# Patient Record
Sex: FEMALE | Race: WHITE
[De-identification: names, ages, dates, MRNs, and addresses within clinical notes are randomized per-mention and may not be internally consistent; named-entity substitution may affect disease eponyms.]

---

## 2019-01-18 ENCOUNTER — HOSPITAL ENCOUNTER (EMERGENCY)
Dept: HOSPITAL 91 - FTE | Age: 34
Discharge: HOME | End: 2019-01-18
Payer: MEDICAID

## 2019-01-18 ENCOUNTER — HOSPITAL ENCOUNTER (EMERGENCY)
Dept: HOSPITAL 10 - FTE | Age: 34
Discharge: HOME | End: 2019-01-18
Payer: MEDICAID

## 2019-01-18 VITALS
HEIGHT: 68 IN | BODY MASS INDEX: 27.83 KG/M2 | WEIGHT: 183.65 LBS | BODY MASS INDEX: 27.83 KG/M2 | HEIGHT: 68 IN | WEIGHT: 183.65 LBS

## 2019-01-18 VITALS — SYSTOLIC BLOOD PRESSURE: 150 MMHG | RESPIRATION RATE: 18 BRPM | DIASTOLIC BLOOD PRESSURE: 86 MMHG | HEART RATE: 63 BPM

## 2019-01-18 DIAGNOSIS — R40.2142: ICD-10-CM

## 2019-01-18 DIAGNOSIS — R40.2362: ICD-10-CM

## 2019-01-18 DIAGNOSIS — R10.9: Primary | ICD-10-CM

## 2019-01-18 DIAGNOSIS — R40.2252: ICD-10-CM

## 2019-01-18 LAB
ADD MAN DIFF?: NO
ADD UMIC: NO
ALANINE AMINOTRANSFERASE: 21 IU/L (ref 13–69)
ALBUMIN/GLOBULIN RATIO: 1.36
ALBUMIN: 4.5 G/DL (ref 3.3–4.9)
ALKALINE PHOSPHATASE: 91 IU/L (ref 42–121)
ANION GAP: 8 (ref 5–13)
ASPARTATE AMINO TRANSFERASE: 25 IU/L (ref 15–46)
BASOPHIL #: 0.1 10^3/UL (ref 0–0.1)
BASOPHILS %: 0.7 % (ref 0–2)
BILIRUBIN,DIRECT: 0 MG/DL (ref 0–0.2)
BILIRUBIN,TOTAL: 0.1 MG/DL (ref 0.2–1.3)
BLOOD UREA NITROGEN: 16 MG/DL (ref 7–20)
CALCIUM: 9.8 MG/DL (ref 8.4–10.2)
CARBON DIOXIDE: 26 MMOL/L (ref 21–31)
CHLORIDE: 104 MMOL/L (ref 97–110)
CREATININE: 0.64 MG/DL (ref 0.44–1)
EOSINOPHILS #: 0.3 10^3/UL (ref 0–0.5)
EOSINOPHILS %: 4 % (ref 0–7)
GLOBULIN: 3.3 G/DL (ref 1.3–3.2)
GLUCOSE: 114 MG/DL (ref 70–220)
HEMATOCRIT: 40.2 % (ref 37–47)
HEMOGLOBIN: 13.6 G/DL (ref 12–16)
IMMATURE GRANS #M: 0.02 10^3/UL (ref 0–0.03)
IMMATURE GRANS % (M): 0.2 % (ref 0–0.43)
LIPASE: 67 U/L (ref 23–300)
LYMPHOCYTES #: 2.2 10^3/UL (ref 0.8–2.9)
LYMPHOCYTES %: 26.9 % (ref 15–51)
MEAN CORPUSCULAR HEMOGLOBIN: 29.3 PG (ref 29–33)
MEAN CORPUSCULAR HGB CONC: 33.8 G/DL (ref 32–37)
MEAN CORPUSCULAR VOLUME: 86.6 FL (ref 82–101)
MEAN PLATELET VOLUME: 12.5 FL (ref 7.4–10.4)
MONOCYTE #: 0.9 10^3/UL (ref 0.3–0.9)
MONOCYTES %: 11 % (ref 0–11)
NEUTROPHIL #: 4.7 10^3/UL (ref 1.6–7.5)
NEUTROPHILS %: 57.2 % (ref 39–77)
NUCLEATED RED BLOOD CELLS #: 0 10^3/UL (ref 0–0)
NUCLEATED RED BLOOD CELLS%: 0 /100WBC (ref 0–0)
PLATELET COUNT: 212 10^3/UL (ref 140–415)
POTASSIUM: 4 MMOL/L (ref 3.5–5.1)
RED BLOOD COUNT: 4.64 10^6/UL (ref 4.2–5.4)
RED CELL DISTRIBUTION WIDTH: 12.6 % (ref 11.5–14.5)
SODIUM: 138 MMOL/L (ref 135–144)
TOTAL PROTEIN: 7.8 G/DL (ref 6.1–8.1)
UR ASCORBIC ACID: NEGATIVE MG/DL
UR BILIRUBIN (DIP): NEGATIVE MG/DL
UR BLOOD (DIP): NEGATIVE MG/DL
UR CLARITY: CLEAR
UR COLOR: YELLOW
UR GLUCOSE (DIP): NEGATIVE MG/DL
UR KETONES (DIP): NEGATIVE MG/DL
UR LEUKOCYTE ESTERASE (DIP): NEGATIVE LEU/UL
UR NITRITE (DIP): NEGATIVE MG/DL
UR PH (DIP): 5 (ref 5–9)
UR SPECIFIC GRAVITY (DIP): 1.01 (ref 1–1.03)
UR TOTAL PROTEIN (DIP): NEGATIVE MG/DL
UR UROBILINOGEN (DIP): NEGATIVE MG/DL
WHITE BLOOD COUNT: 8.3 10^3/UL (ref 4.8–10.8)

## 2019-01-18 PROCEDURE — 83690 ASSAY OF LIPASE: CPT

## 2019-01-18 PROCEDURE — 80053 COMPREHEN METABOLIC PANEL: CPT

## 2019-01-18 PROCEDURE — 76775 US EXAM ABDO BACK WALL LIM: CPT

## 2019-01-18 PROCEDURE — 81025 URINE PREGNANCY TEST: CPT

## 2019-01-18 PROCEDURE — 81003 URINALYSIS AUTO W/O SCOPE: CPT

## 2019-01-18 PROCEDURE — 96374 THER/PROPH/DIAG INJ IV PUSH: CPT

## 2019-01-18 PROCEDURE — 36415 COLL VENOUS BLD VENIPUNCTURE: CPT

## 2019-01-18 PROCEDURE — 85025 COMPLETE CBC W/AUTO DIFF WBC: CPT

## 2019-01-18 PROCEDURE — 96375 TX/PRO/DX INJ NEW DRUG ADDON: CPT

## 2019-01-18 PROCEDURE — 99285 EMERGENCY DEPT VISIT HI MDM: CPT

## 2019-01-18 RX ADMIN — THIAMINE HYDROCHLORIDE 1 MLS/HR: 100 INJECTION, SOLUTION INTRAMUSCULAR; INTRAVENOUS at 19:08

## 2019-01-18 RX ADMIN — KETOROLAC TROMETHAMINE 1 MG: 30 INJECTION, SOLUTION INTRAMUSCULAR at 19:28

## 2019-01-18 RX ADMIN — ONDANSETRON HYDROCHLORIDE 1 MG: 2 INJECTION, SOLUTION INTRAMUSCULAR; INTRAVENOUS at 19:28

## 2019-01-18 NOTE — ERD
ER Documentation


Chief Complaint


Chief Complaint


Flank pain.





HPI


33-year-old female patient with no significant past medical history presents to 


the ED planing of left flank pain that started earlier today.  Patient reports 


that because of her history of kidney stones, she is unsure whether this is 


muscle pain or kidney stone pain because she also twisted the left side of her 


back.  Patient reports that she is taking Macrobid for possible UTI - states 


that she has some suprapubic pain. Denies any fever, chills, nausea, vomiting, 


diarrhea, neck stiffness, abdominal pain.





ROS


All systems reviewed and are negative except as per history of present illness.





Medications


Home Meds


Active Scripts


Ibuprofen* (Motrin*) 600 Mg Tab, 600 MG PO Q6, #30 TAB


   Prov:HIPOLITO RUSSELL PA-C         19


Acetaminophen* (Tylophen*) 500 Mg Capsule, 1 CAP PO Q6H PRN for PAIN AND OR 


ELEVATED TEMP, #20 CAP 0 Refills


   Prov:ANALILIA DUDLEY PA-C         3/15/16





Allergies


Allergies:  


Coded Allergies:  


     No Known Allergy (Unverified , 10/28/16)





PMhx/Soc


History of Surgery:  Yes ()


Anesthesia Reaction:  No


Hx Neurological Disorder:  No


Hx Respiratory Disorders:  No


Hx Cardiac Disorders:  No


Hx Psychiatric Problems:  No


Hx Miscellaneous Medical Probl:  No


Hx Alcohol Use:  No


Hx Substance Use:  No


Hx Tobacco Use:  No


Smoking Status:  Never smoker





FmHx


Family History:  No diabetes, No coronary disease





Physical Exam


Vitals


Vital Signs


  Date      Temp  Pulse  Resp  B/P (MAP)   Pulse Ox  O2          O2 Flow    FiO2


Time                                                 Delivery    Rate


   19  98.1     67    16      167/92        98


     18:36                          (117)





Physical Exam


Const: Non-ill-appearing, well-nourished. In no acute distress.


Head: Atraumatic, normocephalic 


Eyes: Normal Conjunctiva without injection. No purulent discharge. PERRLA. EOMI 


ENT: Normal external ear. Ear canal without erythema. Tympanic membrane pearly 


gray without effusion or bulging. Nasal canal clear with normal turbinates. 


Moist oropharynx without tonsillar exudates. Non-erythematous pharynx. Uvula 


midline. No drooling.  No trismus. 


Neck: No cervical midline tenderness.  Full range of motion. No meningismus. No 


cervical lymphadenopathy. No JVD.


Resp: Clear to auscultation bilaterally. No wheezing, rhonchi, rales, or 


crackles. No accessory muscle use. No retractions.


Cardio: Regular rate and rhythm.  No murmurs, rubs or gallops.


Abd: Soft, non tender, non distended. Normal bowel sounds.  No palpable masses. 


No rebound tenderness.  No guarding.  Negative McBurney's Point. Negative 


Abdi's Sign.


Skin: Normal skin turgor. No petechiae or rashes


Back: No midline tenderness. No CVA tenderness.


Ext: No cyanosis, or edema. Distal pulses intact bilaterally.


Neur: Awake and alert. Normal gait. Normal coordination. Cranial Nerves II- VII 


intact. Normal finger to nose. Muscle strength 5/5. Sensation intact.


Psych: Normal Mood and Affect


Results 24 hrs





Laboratory Tests


Test
                               19
13:00   19
19:10  19
19:21


Urine Color                       YELLOW


Urine Clarity                     CLEAR


Urine pH                                     5.0


Urine Specific Gravity                     1.014


Urine Ketones                     NEGATIVE mg/dL


Urine Nitrite                     NEGATIVE mg/dL


Urine Bilirubin                   NEGATIVE mg/dL


Urine Urobilinogen                NEGATIVE mg/dL


Urine Leukocyte Esterase
         NEGATIVE
Divina/ul  
               



Urine Hemoglobin                  NEGATIVE mg/dL


Urine Glucose                     NEGATIVE mg/dL


Urine Total Protein               NEGATIVE mg/dl


White Blood Count                                    8.3 10^3/ul


Red Blood Count                                     4.64 10^6/ul


Hemoglobin                                             13.6 g/dl


Hematocrit                                                40.2 %


Mean Corpuscular Volume                                  86.6 fl


Mean Corpuscular Hemoglobin                              29.3 pg


Mean Corpuscular                  
                   33.8 g/dl 
  



Hemoglobin
Concent


Red Cell Distribution Width                               12.6 %


Platelet Count                                       212 10^3/UL


Mean Platelet Volume                                     12.5 fl


Immature Granulocytes %                                  0.200 %


Neutrophils %                                             57.2 %


Lymphocytes %                                             26.9 %


Monocytes %                                               11.0 %


Eosinophils %                                              4.0 %


Basophils %                                                0.7 %


Nucleated Red Blood Cells %                          0.0 /100WBC


Immature Granulocytes #                            0.020 10^3/ul


Neutrophils #                                        4.7 10^3/ul


Lymphocytes #                                        2.2 10^3/ul


Monocytes #                                          0.9 10^3/ul


Eosinophils #                                        0.3 10^3/ul


Basophils #                                          0.1 10^3/ul


Nucleated Red Blood Cells #                          0.0 10^3/ul


Sodium Level                                          138 mmol/L


Potassium Level                                       4.0 mmol/L


Chloride Level                                        104 mmol/L


Carbon Dioxide Level                                   26 mmol/L


Anion Gap                                                      8


Blood Urea Nitrogen                                     16 mg/dl


Creatinine                                            0.64 mg/dl


Est Glomerular Filtrat            
                > 60 mL/min 
   



Rate
mL/min


Glucose Level                                          114 mg/dl


Calcium Level                                          9.8 mg/dl


Total Bilirubin                                        0.1 mg/dl


Direct Bilirubin                                      0.00 mg/dl


Indirect Bilirubin                                     0.1 mg/dl


Aspartate Amino                   
                     25 IU/L 
  



Transf
(AST/SGOT)


Alanine                           
                     21 IU/L 
  



Aminotransferase
(ALT/SGPT)


Alkaline Phosphatase                                     91 IU/L


Total Protein                                           7.8 g/dl


Albumin                                                 4.5 g/dl


Globulin                                               3.30 g/dl


Albumin/Globulin Ratio                                      1.36


Lipase                                                    67 U/L


POC Beta HCG, Qualitative                                          NEGATIVE





Current Medications


 Medications
   Dose
          Sig/Myles
       Start Time
   Status  Last


 (Trade)       Ordered        Route
 PRN     Stop Time              Admin
Dose


                              Reason                                Admin


 Sodium         1,000 ml @ 
   Q1H STAT
      19       DC           19


Chloride       1,000 mls/hr   IV
            18:48
                       19:08



                                             19 19:47


 Ondansetron    4 mg           ONCE  STAT
    19       DC           19


HCl
  (Zofran                 IV
            18:48
                       19:28



Inj)                                         19 18:50


 Ketorolac
     30 mg          ONCE  STAT
    19       DC           19


Tromethamine
                 IV
            18:48
                       19:28



 (Toradol)                                   19 18:50








Procedures/MDM


33-year-old female patient with no significant past medical history presents to 


ED complaining of left flank pain that started 2 days ago.  Patient is afebrile 


and nontoxic-appearing.  Patient's blood pressure is 167/92.  Blood Pressure 


Assessment: Patient's blood pressure was elevated (>120/80) but appears stable 


without evidence of hypertension emergency or urgency.  The patient was 


counseled about the risks of hypertension and urged to pursue outpatient 


monitoring and therapy within a week with their primary care physician. Patient 


was further worked up with CBC, CMP, lipase, UA, renal ultrasound. Patient's 


pain and symptoms have improved after treatment with 1 L of normal saline, 30 mg


IV Toradol.





CBC:  No leukocytosis. No e/o of systemic infection. No e/o anemia.


CMP: No e/o severe acidosis, alkalosis, renal failure, diabetic ketoacidosis, 


liver disease


Lipase within normal limits.


Urine: No leukocyte esterase, no nitrites, no hematuria. 


Urine pregnancy: Negative 





IMPRESSION:


 


There is mild left hydronephrosis. A discrete calculus is not identified 


sonographically.


 





Mild left hydronephrosis however no leukocyte esterase, hematuria, nitrite 


noted.  Nonspecific hydronephrosis noted.  Discussed my supervising physician, 


Dr. Escobar who stated that patient can be managed on an outpatient basis.  Low 


suspicion for ectopic pregnancy, ovarian torsion, gastritis, GERD, peptic ulcer 


disease, cholecystitis, choledocholithiasis, cholangitis, pancreatitis, 


appendicitis, bowel obstruction, ileus, volvulus, nephrolithiasis, 


pyelonephritis, hepatitis, perforated viscus, diverticulitis, 


strangulated/incarcerated hernia, DKA, acute abdomen, mesenteric ischemia or 


other emergent conditions.





Diagnosis: Flank Pain 


Discharge medications: Ibuprofen


Follow up with primary care physician in 1-2 days. Instructed patient to return 


to the ED sooner for any worsening symptoms. Patient's questions were answered. 


Patient is hemodynamically stable. Patient understood and agreed with discharge 


plan. Patient discharged stable.





Disclaimer: Inadvertent spelling and grammatical errors are likely due to 


EHR/dictation software use and do not reflect on the overall quality of patient 


care. Also, please note that the electronic time recorded on this note does not 


necessarily reflect the actual time of the patient encounter.





Departure


Diagnosis:  


   Primary Impression:  


   Flank pain


Patient Instructions:  Flank Pain, Uncertain Cause


Referrals:  


[facility]


YOU HAVE RECEIVED A MEDICAL SCREENING EXAM AND THE RESULTS INDICATE THAT YOU DO 


NOT HAVE A CONDITION THAT REQUIRES URGENT TREATMENT IN THE EMERGENCY DEPARTMENT.





FURTHER EVALUATION AND TREATMENT OF YOUR CONDITION CAN WAIT UNTIL YOU ARE SEEN 


IN YOUR DOCTORS OFFICE WITHIN THE NEXT 1-2 DAYS. IT IS YOUR RESPONSIBILITY TO 


MAKE AN APPOINTMENT FOR FOLOW-UP CARE.





IF YOU HAVE A PRIMARY DOCTOR


--you should call your primary doctor and schedule an appointment





IF YOU DO NOT HAVE A PRIMARY DOCTOR YOU CAN CALL OUR PHYSICIAN REFERRAL HOTLINE 


AT


 [phone] 





IF YOU CAN NOT AFFORD TO SEE A PHYSICIAN YOU CAN CHOSE FROM THE FOLLOWING 


[facility]





[facility] [phone] [address] [facility] [phone] [address] [facility] [phone] [address] [facility] [phone] [address] [facility]. [facility] [phone] [address] [facility]. [facility]. [phone] [address] [facility]. [facility]


YOU HAVE RECEIVED A MEDICAL SCREENING EXAM AND THE RESULTS INDICATE THAT YOU DO 


NOT HAVE A CONDITION THAT REQUIRES URGENT TREATMENT IN THE EMERGENCY DEPARTMENT.





FURTHER EVALUATION AND TREATMENT OF YOUR CONDITION CAN WAIT UNTIL YOU ARE SEEN 


IN YOUR DOCTORS OFFICE WITHIN THE NEXT 1-2 DAYS. IT IS YOUR RESPONSIBILITY TO 


MAKE AN APPOINTMENT FOR FOLOW-UP CARE.





IF YOU HAVE A PRIMARY DOCTOR


--you should call your primary doctor and schedule and appointment





IF YOU DO NOT HAVE A PRIMARY DOCTOR YOU CAN CALL OUR PHYSICIAN REFERRAL HOTLINE 


AT [phone].





IF YOU CAN NOT AFFORD TO SEE A PHYSICIAN YOU CAN CHOSE FROM THE FOLLOWING [facility]


INSTITUTIONS:





[facility]


[address] [place] [address]





[facility]


[address] [place], [place] [address]


[address] [place] [address]





[facility] URGENT CARE/SPECIALTIES





Additional Instructions:  


Llame al doctor MAANA y tatyana franchesca SASHA PARA DENTRO DE 2-3 TELLEZ.Dgale a la 


secretaria que nosotros le instruimos hacer esta sasha.Avise o llame si zaidi 


condicin se empeora antes de la sasha. Regresa aqui si peor o no mejor.











HIPOLITO RUSSELL PA-C              2019 20:48

## 2019-10-31 ENCOUNTER — HOSPITAL ENCOUNTER (EMERGENCY)
Dept: HOSPITAL 10 - FTE | Age: 34
Discharge: HOME | End: 2019-10-31
Payer: MEDICAID

## 2019-10-31 VITALS — HEART RATE: 63 BPM | SYSTOLIC BLOOD PRESSURE: 182 MMHG | RESPIRATION RATE: 20 BRPM | DIASTOLIC BLOOD PRESSURE: 84 MMHG

## 2019-10-31 VITALS
BODY MASS INDEX: 28.57 KG/M2 | WEIGHT: 188.5 LBS | BODY MASS INDEX: 28.57 KG/M2 | HEIGHT: 68 IN | HEIGHT: 68 IN | WEIGHT: 188.5 LBS

## 2019-10-31 DIAGNOSIS — R06.02: Primary | ICD-10-CM

## 2019-10-31 PROCEDURE — 93005 ELECTROCARDIOGRAM TRACING: CPT

## 2019-10-31 PROCEDURE — 71045 X-RAY EXAM CHEST 1 VIEW: CPT
